# Patient Record
Sex: FEMALE | Race: WHITE | Employment: OTHER | URBAN - METROPOLITAN AREA
[De-identification: names, ages, dates, MRNs, and addresses within clinical notes are randomized per-mention and may not be internally consistent; named-entity substitution may affect disease eponyms.]

---

## 2017-05-16 ENCOUNTER — HOSPITAL ENCOUNTER (OUTPATIENT)
Dept: PULMONOLOGY | Age: 72
Discharge: HOME | End: 2017-05-16

## 2017-05-16 VITALS — RESPIRATION RATE: 14 BRPM | OXYGEN SATURATION: 98 %

## 2017-05-16 DIAGNOSIS — R91.1 SOLITARY PULMONARY NODULE: ICD-10-CM

## 2017-05-16 DIAGNOSIS — R91.1 PULMONARY NODULE: ICD-10-CM

## 2017-05-16 DIAGNOSIS — Z85.118 HISTORY OF LUNG CANCER: ICD-10-CM

## 2017-05-16 RX ORDER — ALBUTEROL SULFATE 2.5 MG/3ML
2.5 SOLUTION RESPIRATORY (INHALATION) ONCE
Status: COMPLETED | OUTPATIENT
Start: 2017-05-16 | End: 2017-05-16

## 2017-05-16 RX ADMIN — ALBUTEROL SULFATE 2.5 MG: 2.5 SOLUTION RESPIRATORY (INHALATION) at 12:03

## 2017-05-17 ENCOUNTER — OFFICE VISIT (OUTPATIENT)
Dept: PULMONOLOGY | Age: 72
End: 2017-05-17

## 2017-05-17 VITALS
SYSTOLIC BLOOD PRESSURE: 124 MMHG | HEART RATE: 81 BPM | BODY MASS INDEX: 21.86 KG/M2 | HEIGHT: 66 IN | OXYGEN SATURATION: 98 % | TEMPERATURE: 97.2 F | DIASTOLIC BLOOD PRESSURE: 54 MMHG | WEIGHT: 136 LBS

## 2017-05-17 DIAGNOSIS — R91.1 PULMONARY NODULE: Primary | ICD-10-CM

## 2017-05-17 DIAGNOSIS — R59.0 MEDIASTINAL LYMPHADENOPATHY: ICD-10-CM

## 2017-05-17 DIAGNOSIS — J43.9 PULMONARY EMPHYSEMA, UNSPECIFIED EMPHYSEMA TYPE (HCC): ICD-10-CM

## 2017-05-17 PROCEDURE — 99214 OFFICE O/P EST MOD 30 MIN: CPT | Performed by: INTERNAL MEDICINE

## 2017-05-17 RX ORDER — ALBUTEROL SULFATE 90 UG/1
2 AEROSOL, METERED RESPIRATORY (INHALATION) EVERY 6 HOURS PRN
Qty: 1 INHALER | Refills: 6 | Status: SHIPPED | OUTPATIENT
Start: 2017-05-17 | End: 2017-11-20

## 2017-11-20 ENCOUNTER — OFFICE VISIT (OUTPATIENT)
Dept: PULMONOLOGY | Age: 72
End: 2017-11-20

## 2017-11-20 ENCOUNTER — TELEPHONE (OUTPATIENT)
Dept: PULMONOLOGY | Age: 72
End: 2017-11-20

## 2017-11-20 VITALS
HEART RATE: 79 BPM | HEIGHT: 66 IN | WEIGHT: 140.6 LBS | SYSTOLIC BLOOD PRESSURE: 114 MMHG | RESPIRATION RATE: 20 BRPM | OXYGEN SATURATION: 97 % | TEMPERATURE: 97.5 F | BODY MASS INDEX: 22.6 KG/M2 | DIASTOLIC BLOOD PRESSURE: 58 MMHG

## 2017-11-20 DIAGNOSIS — J84.9 ILD (INTERSTITIAL LUNG DISEASE) (HCC): ICD-10-CM

## 2017-11-20 DIAGNOSIS — J43.9 PULMONARY EMPHYSEMA, UNSPECIFIED EMPHYSEMA TYPE (HCC): Primary | ICD-10-CM

## 2017-11-20 DIAGNOSIS — R09.81 NASAL CONGESTION: ICD-10-CM

## 2017-11-20 PROCEDURE — 99214 OFFICE O/P EST MOD 30 MIN: CPT | Performed by: INTERNAL MEDICINE

## 2017-11-20 RX ORDER — ALBUTEROL SULFATE 2.5 MG/3ML
2.5 SOLUTION RESPIRATORY (INHALATION) EVERY 6 HOURS PRN
Qty: 120 EACH | Refills: 5 | Status: SHIPPED | OUTPATIENT
Start: 2017-11-20 | End: 2018-10-02

## 2017-11-20 RX ORDER — ALBUTEROL SULFATE 90 UG/1
2 AEROSOL, METERED RESPIRATORY (INHALATION) EVERY 6 HOURS PRN
COMMUNITY
End: 2018-10-02

## 2017-11-20 RX ORDER — FLUTICASONE PROPIONATE 50 MCG
2 SPRAY, SUSPENSION (ML) NASAL DAILY
Qty: 1 BOTTLE | Refills: 5 | Status: SHIPPED | OUTPATIENT
Start: 2017-11-20 | End: 2020-01-01

## 2017-11-20 NOTE — PROGRESS NOTES
12/14/2011 FEV1 2.18L(99%)   TLC 4.77L(93%)   DLCO 16.8   (91%)  PFTs 03/24/2011 FEV1 2.30L             TLC 5.14L             DLCO 18.32      CT chest 5/16/2017  images reviewed by me and showed:   Mediastinum: No significant change size and appearance of pre-vascular lymph nodes. No new enlarged mediastinal nodes. Thoracic aorta normal in caliber. No significant pericardial effusion   Lungs/pleura: Status post partial pneumonectomy on the left. There is emphysema. There are stable ground-glass and coarse reticular opacities in both lungs. There are calcified granulomas. There are no new suspicious pulmonary nodules. The pleural spaces are clear. Upper Abdomen: Adrenal glands unremarkable. Calcified gallstone noted   Soft Tissues/Bones: No suspicious bone lesions       Assessment:      · Emphysema  · Nasal congestion - Likely allergic rhinitis  · Basilar mild ILD. IPF vs NSIP. Negative CTD workup. Not very symptomatic. Will continue to follow up with PFTs. · RML pulmonary nodule, more prominent on CT chest 11/17/2016. Improved on repeat CT chest 5/16/2017   · Anterior mediastinal enlarged LN 11/2/2015 with SUV of 3.3. Bx  11/23/2015 showed small amount of fibroadipose tissue with a small lymphoid aggregate. Negative flow cytometry. Favor reactive LN. Stable on repeat CT  · Post thoracotomy pain syndrome - improved and now off gabapentin    · LLL 1.7 cm nodule 10/2/2014 (SUV 4.9). 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 Declined adjuvant chemotherapy   · Lingula wedge resection showed calcified granulomas with fungal organism.    · > 50 pack year smoking        Plan:      · PFTs and CT chest 5/2018   · Trial of Albuterol Neb Q4-6 hrs PRN   · Albuterol 2 puffs Q4-6 hrs PRN  · Trial of Flonase 2 sprays/nostril daily  · Patient is up to date with Pneumococcal vaccine and influenza vaccine   · Smoking cessation counseling

## 2018-05-23 ENCOUNTER — HOSPITAL ENCOUNTER (OUTPATIENT)
Dept: PULMONOLOGY | Age: 73
Discharge: HOME | End: 2018-05-23
Attending: INTERNAL MEDICINE | Admitting: INTERNAL MEDICINE

## 2018-05-23 ENCOUNTER — OFFICE VISIT (OUTPATIENT)
Dept: PULMONOLOGY | Age: 73
End: 2018-05-23

## 2018-05-23 VITALS
HEIGHT: 66 IN | WEIGHT: 140 LBS | HEART RATE: 73 BPM | OXYGEN SATURATION: 97 % | DIASTOLIC BLOOD PRESSURE: 52 MMHG | SYSTOLIC BLOOD PRESSURE: 125 MMHG | TEMPERATURE: 97.5 F | RESPIRATION RATE: 16 BRPM | BODY MASS INDEX: 22.5 KG/M2

## 2018-05-23 VITALS — OXYGEN SATURATION: 98 %

## 2018-05-23 DIAGNOSIS — R59.1 LYMPHADENOPATHY: ICD-10-CM

## 2018-05-23 DIAGNOSIS — F17.200 CURRENT SMOKER: ICD-10-CM

## 2018-05-23 DIAGNOSIS — R09.81 NASAL CONGESTION: ICD-10-CM

## 2018-05-23 DIAGNOSIS — R91.1 PULMONARY NODULE: ICD-10-CM

## 2018-05-23 DIAGNOSIS — R91.1 PULMONARY NODULE: Primary | ICD-10-CM

## 2018-05-23 DIAGNOSIS — J43.9 PULMONARY EMPHYSEMA, UNSPECIFIED EMPHYSEMA TYPE (HCC): ICD-10-CM

## 2018-05-23 DIAGNOSIS — R59.0 MEDIASTINAL LYMPHADENOPATHY: ICD-10-CM

## 2018-05-23 DIAGNOSIS — J84.9 ILD (INTERSTITIAL LUNG DISEASE) (HCC): ICD-10-CM

## 2018-05-23 PROCEDURE — 99214 OFFICE O/P EST MOD 30 MIN: CPT | Performed by: INTERNAL MEDICINE

## 2018-05-23 RX ORDER — ALBUTEROL SULFATE 2.5 MG/3ML
2.5 SOLUTION RESPIRATORY (INHALATION) ONCE
Status: COMPLETED | OUTPATIENT
Start: 2018-05-23 | End: 2018-05-23

## 2018-05-23 RX ORDER — VARENICLINE TARTRATE 0.5 MG/1
TABLET, FILM COATED ORAL
Qty: 319 TABLET | Refills: 0 | Status: SHIPPED | OUTPATIENT
Start: 2018-05-23 | End: 2018-10-02 | Stop reason: SINTOL

## 2018-05-23 RX ADMIN — ALBUTEROL SULFATE 2.5 MG: 2.5 SOLUTION RESPIRATORY (INHALATION) at 10:48

## 2018-05-23 ASSESSMENT — ENCOUNTER SYMPTOMS: SHORTNESS OF BREATH: 1

## 2018-05-29 ENCOUNTER — TELEPHONE (OUTPATIENT)
Dept: PULMONOLOGY | Age: 73
End: 2018-05-29

## 2018-09-19 ENCOUNTER — TELEPHONE (OUTPATIENT)
Dept: PULMONOLOGY | Age: 73
End: 2018-09-19

## 2018-09-19 ENCOUNTER — HOSPITAL ENCOUNTER (OUTPATIENT)
Dept: CT IMAGING | Age: 73
Discharge: HOME | End: 2018-09-19
Payer: COMMERCIAL

## 2018-09-19 ENCOUNTER — HOSPITAL ENCOUNTER (OUTPATIENT)
Dept: PULMONOLOGY | Age: 73
Discharge: HOME | End: 2018-09-19
Payer: COMMERCIAL

## 2018-09-19 ENCOUNTER — OFFICE VISIT (OUTPATIENT)
Dept: PULMONOLOGY | Age: 73
End: 2018-09-19

## 2018-09-19 VITALS
RESPIRATION RATE: 18 BRPM | OXYGEN SATURATION: 95 % | WEIGHT: 141.6 LBS | SYSTOLIC BLOOD PRESSURE: 132 MMHG | HEART RATE: 76 BPM | TEMPERATURE: 97.9 F | HEIGHT: 66 IN | DIASTOLIC BLOOD PRESSURE: 56 MMHG | BODY MASS INDEX: 22.76 KG/M2

## 2018-09-19 DIAGNOSIS — C79.9 METASTATIC DISEASE (HCC): ICD-10-CM

## 2018-09-19 DIAGNOSIS — R91.1 SOLITARY PULMONARY NODULE: ICD-10-CM

## 2018-09-19 DIAGNOSIS — Z85.118 HISTORY OF LUNG CANCER: ICD-10-CM

## 2018-09-19 DIAGNOSIS — R91.1 PULMONARY NODULE: Primary | ICD-10-CM

## 2018-09-19 DIAGNOSIS — J84.9 ILD (INTERSTITIAL LUNG DISEASE) (HCC): ICD-10-CM

## 2018-09-19 DIAGNOSIS — R59.1 LYMPHADENOPATHY: ICD-10-CM

## 2018-09-19 DIAGNOSIS — Z87.891 PERSONAL HISTORY OF SMOKING: ICD-10-CM

## 2018-09-19 DIAGNOSIS — R91.1 PULMONARY NODULE: ICD-10-CM

## 2018-09-19 DIAGNOSIS — J43.9 PULMONARY EMPHYSEMA, UNSPECIFIED EMPHYSEMA TYPE (HCC): ICD-10-CM

## 2018-09-19 DIAGNOSIS — R91.1 LUNG NODULE: Primary | ICD-10-CM

## 2018-09-19 PROCEDURE — 71250 CT THORAX DX C-: CPT

## 2018-09-19 PROCEDURE — 99214 OFFICE O/P EST MOD 30 MIN: CPT | Performed by: INTERNAL MEDICINE

## 2018-09-19 PROCEDURE — 94618 PULMONARY STRESS TESTING: CPT

## 2018-09-19 PROCEDURE — 94060 EVALUATION OF WHEEZING: CPT

## 2018-09-19 PROCEDURE — 94664 DEMO&/EVAL PT USE INHALER: CPT

## 2018-09-19 PROCEDURE — 94640 AIRWAY INHALATION TREATMENT: CPT

## 2018-09-19 PROCEDURE — 6360000002 HC RX W HCPCS: Performed by: INTERNAL MEDICINE

## 2018-09-19 PROCEDURE — 94729 DIFFUSING CAPACITY: CPT

## 2018-09-19 PROCEDURE — 94726 PLETHYSMOGRAPHY LUNG VOLUMES: CPT

## 2018-09-19 RX ORDER — ALBUTEROL SULFATE 2.5 MG/3ML
2.5 SOLUTION RESPIRATORY (INHALATION) ONCE
Status: COMPLETED | OUTPATIENT
Start: 2018-09-19 | End: 2018-09-19

## 2018-09-19 RX ADMIN — ALBUTEROL SULFATE 2.5 MG: 2.5 SOLUTION RESPIRATORY (INHALATION) at 09:55

## 2018-09-19 NOTE — PATIENT INSTRUCTIONS
Tips to Help You Stop Smoking (taken from Up-To-Date)      Cigarette smoking is a preventable cause of death in the [place]. Quitting smoking now can decrease your risk of getting smoking-related illnesses like heart disease, stroke, cancer & COPD. S = Set a quit date. T = Tell family, friends, and the people around you that you plan to quit. A = Anticipate or plan ahead for the tough times you'll face while quitting. R = Remove cigarettes and other tobacco products from your home, car, and work. T = Talk to your doctor about getting help to quit. Your doctor may give you medicines to reduce your craving for cigarettes & the unpleasant symptoms that happen when you stop smoking (called withdrawal symptoms). What are the symptoms of withdrawal?  The symptoms include:   ?Trouble sleeping   ? Being irritable, anxious or restless   ? Getting frustrated or angry   ? Having trouble thinking clearly  Nicotine replacement therapy eases withdrawal and reduces your bodys craving for nicotine, the main drug found in cigarettes. Non-prescription forms of nicotine replacement include skin patches, lozenges, and gum. Two prescription medications are available that have been proven to help people stop smoking:  ? Bupropion is a prescription medicine that reduces your desire to smoke. This medicine is sold under the brand names Zyban and Wellbutrin & as a generic formulation. ? Varenicline (brand name: Chantix) is a prescription medicine that reduces withdrawal symptoms and cigarette cravings. If you take bupropion or varenicline and you have any new or worsening depressed mood or thoughts of harming yourself or someone else, stop taking the medicine and call your doctor. Buproprion should not be taken by anyone with a history of seizure or epilepsy. Will I gain weight if I quit?  Yes, you might gain a few pounds.  But quitting smoking will have a much more positive effect on your health than weighing a few pounds more. Plus, you can help prevent some weight gain by being more active and eating less. Taking the medicine bupropion might help control weight gain. What else can I do to improve my chances of quitting?  You can:  ?Start exercising. ?Stay away from smokers and places that you associate with smoking. If people close to you smoke, ask them to quit with you. ? Keep gum, hard candy, or something to put in your mouth handy. If you get a craving for a cigarette, try one of these instead. ?Dont give up, even if you start smoking again. It takes most people a few tries before they succeed. You can also get help from a free phone line (2-501-QUIT-NOW) or go online to www.smokefree.gov. Your pulmonary team is here to help you quit.   Call for assistance [phone]

## 2018-09-19 NOTE — PROGRESS NOTES
[facility]                                                                    CHIEF COMPLAINT: Follow-up CT chest and PFTs        HPI:   PFTs and CT chest were reviewed by me and noted. Results were dicussed with patient and multiple good questions were answered. Breathing is good. Occasional cough and wheezes. Clear sputum. No hemoptysis. Tries to be active. Smoking still 1/2 ppd     Past Medical History:   Diagnosis Date    Acid reflux     Arthritis     back and Rt hip pain    Emphysema of lung (HCC)     Hyperlipidemia     Hypertension     ILD (interstitial lung disease) (HCC)     Lung nodule left    Pneumonia     Seasonal allergies     Thyroid disease        Past Surgical History:        Procedure Laterality Date    BONE BIOPSY  2/10/2011    left hip and pelvis    COLONOSCOPY  12/2/10    EYE SURGERY      LUNG BIOPSY Left 10/14/2014    OTHER SURGICAL HISTORY Left 12/29/2014    left thoracotomy,lowerlobectomy,upper wedge resection    TONSILLECTOMY      TUBAL LIGATION         Allergies:  is allergic to lipitor [atorvastatin]. Social History:    TOBACCO:   reports that she has been smoking Cigarettes. She has a 40.00 pack-year smoking history. She has never used smokeless tobacco.  ETOH:   reports that she drinks about 0.6 oz of alcohol per week .       Family History:   Family History   Problem Relation Age of Onset    Stroke Mother    [facility] Cancer Sister         one sis colon   one pancreatic    Hypertension Sister     Stroke Sister    [facility] Cancer Sister     Hypertension Sister     Heart Failure Brother     Hypertension Brother     Asthma Neg Hx     Emphysema Neg Hx     Diabetes Neg Hx        Current Medications:    Current Outpatient Prescriptions:     Cetirizine HCl (ZYRTEC ALLERGY) 10 MG CAPS, Take 10 mg by mouth, Disp: , Rfl:     albuterol sulfate  (90 Base) MCG/ACT inhaler, Inhale 2 puffs into the lungs every 6 hours as needed for Wheezing, Disp: , Rfl:     albuterol (PROVENTIL) (2.5 MG/3ML) 0.083% nebulizer solution, Take 3 mLs by nebulization every 6 hours as needed for Wheezing J43.9, Disp: 120 each, Rfl: 5    fluticasone (FLONASE) 50 MCG/ACT nasal spray, 2 sprays by Nasal route daily, Disp: 1 Bottle, Rfl: 5    pravastatin (PRAVACHOL) 40 MG tablet, Take 40 mg by mouth daily, Disp: , Rfl:     gabapentin (NEURONTIN) 300 MG capsule, Take 300 mg by mouth every other day , Disp: , Rfl:     esomeprazole (NEXIUM) 20 MG capsule, Take 20 mg by mouth every morning (before breakfast). , Disp: , Rfl:     Losartan Potassium (COZAAR PO), Take 50 mg by mouth daily. , Disp: , Rfl:     levothyroxine (SYNTHROID) 100 MCG tablet, Take 75 mcg by mouth daily. , Disp: , Rfl:     varenicline (CHANTIX) 0.5 MG tablet, 0.5 mg daily for 3 days 0.5 mg BID for 4 days 1 mg po BID for 11 weeks, Disp: 319 tablet, Rfl: 0    Blood pressure (!) 132/56, pulse 76, temperature 97.9 °F (36.6 °C), resp. rate 18, height 5' 5.5\" (1.664 m), weight 141 lb 9.6 oz (64.2 kg), SpO2 95 %.' on RA  Gen: No distress. Eyes: PERRL. No sclera icterus. No conjunctival injection. ENT: No discharge. Pharynx clear. Neck: Trachea midline. No obvious mass. Resp: No accessory muscle use. R crackles. No wheezes. No rhonchi. No dullness on percussion. Good air entry. CV: Regular rate. Regular rhythm. No murmur or rub. No edema. GI: Non-tender. Non-distended. No hernia. Skin: Warm and dry. No nodule on exposed extremities. Lymph: No cervical LAD. No supraclavicular LAD. M/S: No cyanosis. No joint deformity. No clubbing. Neuro: Awake. Alert. Moves all four extremities. Psych: Oriented x 3. No anxiety.      DATA reviewed by me:   PFTs 09/19/2018 FEV1 2.01L(87%)   TLC 4.21(80%)     DLCO 11.92 (42%) 6MW 780 LO2 94%   PFTs 05/23/2018 FEV1 1.87L(81%)   TLC 4.24(81%)     DLCO 10.51 (46%)   PFTs 05/16/2017 FEV1 1.88L(80%)   TLC 4.83(91%)     DLCO 13.03 (57%)   PFTs 11/12/2015 FEV1 2.00L(84%)   TLC 4.08(77%)     DLCO 12.43 (54%) 6MW 1120F L O2 97% RA  PFTs 06/16/2015 FEV1 1.77L(73%)   TLC 4.04L(76%)   DLCO 15.59 (67%) 6MW 1120F L O2 93% RA  PFTs 05/13/2014 FEV1 2.12L(87%)   TLC 5.24L(98%)   DLCO 16.78 (72%) 6MW 1120F L O2 93% RA  PFTs 03/21/2013 FEV1 2.15L(87%)   TLC 5.43L(101%) DLCO 16.96 (72%) 6MW 1120F L O2 95% RA  PFTs 09/27/2012 FEV1 2.12L(85%)   TLC 5.31L(99%)   DLCO 16.74 (71%) 6MW 1260F L O2 95% RA  PFTs 03/21/2012 FEV1 2.27L(102%) TLC 5.14L(101%) DLCO 18.48 (91%) 6MW 1350F L O2 94% RA  PFTs 12/14/2011 FEV1 2.18L(99%)   TLC 4.77L(93%)   DLCO 16.8   (91%)  PFTs 03/24/2011 FEV1 2.30L             TLC 5.14L             DLCO 18.32      CT chest 5/23/2018    Increase groundglass attenuation left lung  New left 8mm nodule  Emphysema with chronic interstitial 1 disease  Stable mediastinal lymph nodes    CT chest 9/19/18 imaging reviewed by me and showed  Growing left upper lobe nodule on my read    Assessment:   · New 8 mm TOSHIA nodule. Growing on repeat CT chest 9/19/2018. Concerning for bronchogenic carcinoma   · Pulmonary Emphysema  · Basilar mild ILD. IPF vs NSIP vs smoking related ILD. Negative CTD workup. · RML pulmonary nodule, more prominent on CT chest 11/17/2016. Improved on repeat CT chest 5/16/2017   · Anterior mediastinal enlarged LN 11/2/2015 with SUV of 3.3. Bx  11/23/2015 showed small amount of fibroadipose tissue with a small lymphoid aggregate. Negative flow cytometry. Favor reactive LN. Stable on repeat CT  · Post thoracotomy pain syndrome - improved and now off gabapentin    · LLL 1.7 cm nodule 10/2/2014 (SUV 4.9). 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 Declined adjuvant chemotherapy   · Lingula wedge resection showed calcified granulomas with fungal organism.    · > 50 pack year smoking        Plan:   PET scan   Bronch with EBUS TBNA if hypermetabolic adenopathy   CT surgery resection evaluation if negative PET for metastatic disease.     PFTs/6MW in 6 months  Albuterol Neb/INH Q4-6 hrs PRN   Patient is up to date with Pneumococcal vaccine   Advised to get influenza vaccine this year   Smoking cessation counseling

## 2018-09-25 ENCOUNTER — HOSPITAL ENCOUNTER (OUTPATIENT)
Dept: PET IMAGING | Age: 73
Discharge: HOME | End: 2018-09-25
Payer: COMMERCIAL

## 2018-09-25 ENCOUNTER — HOSPITAL ENCOUNTER (OUTPATIENT)
Age: 73
Discharge: HOME | End: 2018-09-25
Payer: COMMERCIAL

## 2018-09-25 DIAGNOSIS — R91.1 LUNG NODULE: ICD-10-CM

## 2018-09-25 PROCEDURE — A9552 F18 FDG: HCPCS | Performed by: INTERNAL MEDICINE

## 2018-09-25 PROCEDURE — 3430000000 HC RX DIAGNOSTIC RADIOPHARMACEUTICAL: Performed by: INTERNAL MEDICINE

## 2018-09-25 PROCEDURE — 78815 PET IMAGE W/CT SKULL-THIGH: CPT

## 2018-09-25 RX ORDER — FLUDEOXYGLUCOSE F 18 200 MCI/ML
13.7 INJECTION, SOLUTION INTRAVENOUS
Status: COMPLETED | OUTPATIENT
Start: 2018-09-25 | End: 2018-09-25

## 2018-09-25 RX ADMIN — FLUDEOXYGLUCOSE F 18 13.7 MILLICURIE: 200 INJECTION, SOLUTION INTRAVENOUS at 14:03

## 2018-09-26 NOTE — TELEPHONE ENCOUNTER
Order pended. Patient scheduled with Dr Callie Barros 10/2/18 @ 12:30, patient also given PFT and f/u information with verbal understanding.      PET Scan  Notes recorded by Kenna Mcrae MD on 9/26/2018 at 8:04 AM EDT  I called patient and left a message to call back   Needs referral to Dr. Callie Barros for resection evaluation  SBRT if she is deemed not surgical candidate

## 2018-09-27 NOTE — TELEPHONE ENCOUNTER
Dr. Yanira Gandara returned call will be out of office Friday 9/28/18. Can be reached on Monday in office.

## 2018-10-02 ENCOUNTER — OFFICE VISIT (OUTPATIENT)
Dept: CARDIOTHORACIC SURGERY | Age: 73
End: 2018-10-02
Payer: COMMERCIAL

## 2018-10-02 VITALS
WEIGHT: 142 LBS | DIASTOLIC BLOOD PRESSURE: 64 MMHG | TEMPERATURE: 97.5 F | HEART RATE: 74 BPM | OXYGEN SATURATION: 97 % | HEIGHT: 66 IN | SYSTOLIC BLOOD PRESSURE: 138 MMHG | BODY MASS INDEX: 22.82 KG/M2

## 2018-10-02 DIAGNOSIS — Z85.118 HISTORY OF LUNG CANCER: ICD-10-CM

## 2018-10-02 DIAGNOSIS — F17.200 TOBACCO USE DISORDER: ICD-10-CM

## 2018-10-02 DIAGNOSIS — R91.1 PULMONARY NODULE, LEFT: Primary | ICD-10-CM

## 2018-10-02 PROCEDURE — 99215 OFFICE O/P EST HI 40 MIN: CPT | Performed by: THORACIC SURGERY (CARDIOTHORACIC VASCULAR SURGERY)

## 2018-10-02 RX ORDER — LEVOTHYROXINE SODIUM 0.07 MG/1
75 TABLET ORAL DAILY
COMMUNITY

## 2018-10-02 NOTE — PROGRESS NOTES
Review of Systems:  Constitutional:  No night sweats, weight loss. + headaches  Eyes:  No glaucoma, cataracts (removed). Wears glasses. ENMT:  No nosebleeds, deviated septum. Nasal stuffiness. Cardiac:  No arrhythmias, palpitations. Vascular:  No claudication, varicosities. GI:  No PUD.  + GERD, heartburn. :  No kidney stones, frequent UTIs  Musculoskeletal:  + arthritis. No gout. Respiratory:  Occasional SOB. + emphysema. Integumentary:  No dermatitis, itching, rash. + moles. Neurological:  No stroke, TIAs, seizures. Psychiatric:  Situational depression, anxiety. Endocrine: No diabetes. + thyroid issues. Hematologic:  No bleeding, easy bruising. Immunologic:  + known cancer. No steroid therapies.

## 2018-10-02 NOTE — PROGRESS NOTES
Department of Cardiovascular & Thoracic Surgery  History and Physical          DIAGNOSIS:  Pulmonary nodule. S/P left lower lobe lung cancer resection. Tobacco abuse disorder. CHIEF COMPLAINT:    Chief Complaint   Patient presents with   Chaka Belle Patient     Mrs. Rusty Jeffery is being seen, at the request of Dr. Marissa Thorne, for surgical consideration of a new Left Lung Nodule found during surveillance. Denies shortness of breath, hemoptysis but does have a cough. History Obtained From:  patient, spouse, electronic medical record    HISTORY OF PRESENT ILLNESS:      The patient is a 67 y.o. female with significant past medical history of left lower lobe lung cancer for which she underwent a left lower lobectomy 12/29/14, COPD and hypertension who presents with an enlarging left upper lobe pulmonary nodule that is PET avid. She has had no cough. No hemoptysis. No fevers or chills. She is followed by Dr. Marissa Thorne and on her most recent CT scan of the chest was found to have enlargement of this left lung nodule that is spiculated in nature. She also has an enlarged intermedius a lymph node that is also PET avid. She has no chest pain.   I am asked to see her regarding surgical resection of her pulmonary nodule    Past Medical History:    Past Medical History:   Diagnosis Date    Acid reflux     Arthritis     back and Rt hip pain    Emphysema of lung ([address]     Hyperlipidemia     Hypertension     ILD (interstitial lung disease) ([address]     Lung nodule left    Pneumonia     Seasonal allergies     Thyroid disease        Past Surgical History:    Past Surgical History:   Procedure Laterality Date    BONE BIOPSY  2/10/2011    left hip and pelvis    COLONOSCOPY  12/2/10    EYE SURGERY      LUNG BIOPSY Left 10/14/2014    OTHER SURGICAL HISTORY Left 12/29/2014    left thoracotomy,lowerlobectomy,upper wedge resection    TONSILLECTOMY      TUBAL LIGATION         Medications:   Current Outpatient Prescriptions MCHC 33.9 11/23/2015    RDW 13.2 11/23/2015     11/23/2015    MPV 7.3 11/23/2015     CMP:    Lab Results   Component Value Date     01/01/2015    K 3.6 01/01/2015    CL 93 01/01/2015    CO2 23 01/01/2015    BUN 11 01/01/2015    CREATININE 0.8 05/17/2016    GFRAA >60 05/17/2016    LABGLOM >60 05/17/2016    GLUCOSE 131 01/01/2015    LABALBU 3.6 12/31/2014    CALCIUM 9.1 01/01/2015     Hepatic Function Panel:    Lab Results   Component Value Date    LABALBU 3.6 12/31/2014 9/19/18 CT Chest scan:  I have independently reviewed and interpreted this study and based my surgical recommendations to the patient on those findings. I agree with the following:   Spiculated lesion left lower lobe that has almost doubled in size since the last CT scan of the chest in May 2018. This coincides with a hypermetabolic activity seen on PET scan. There is a anterior mediastinal lymph node that has enlarged in size and is also patent avid. There are no pleural effusions. 9/25/18 PET/CT:I have independently reviewed and interpreted this study and based my surgical recommendations to the patient on those findings. I agree with the following:   HEAD/NECK: No metabolically active cervical lymphadenopathy.       CHEST: Focal FDG activity localizes to the anterior mediastinal nodule   measuring 1.9 x 1.5 cm, SUV max of 4.8, not significantly changed since the   recent CT scan when accounting for differences in slice selection.  A similar   nodule was present on the PET-CT scan 11/05/2015 measuring 1.6 x 0.8 cm at   that time.       Focal FDG activity localizes to a 1.5 x 1.4 cm left lower lobe nodule, not   significantly changed from the recent exam, SUV max of 17.6.       No other metabolically active pulmonary nodule. ASSESSMENT AND PLAN:    69-year-old female with a history of lung cancer. She continues to smoke cigarettes. She presents now in follow-up with an enlarging left lung pulmonary nodule.   It is

## 2018-10-09 ENCOUNTER — TELEPHONE (OUTPATIENT)
Dept: CARDIOTHORACIC SURGERY | Age: 73
End: 2018-10-09

## 2018-11-05 ENCOUNTER — HOSPITAL ENCOUNTER (OUTPATIENT)
Dept: CT IMAGING | Age: 73
Discharge: HOME | End: 2018-11-05
Payer: MEDICARE

## 2018-11-05 ENCOUNTER — HOSPITAL ENCOUNTER (OUTPATIENT)
Dept: GENERAL RADIOLOGY | Age: 73
Discharge: HOME | End: 2018-11-05
Payer: MEDICARE

## 2018-11-05 ENCOUNTER — HOSPITAL ENCOUNTER (OUTPATIENT)
Age: 73
Discharge: HOME | End: 2018-11-05
Payer: MEDICARE

## 2018-11-05 VITALS
HEART RATE: 74 BPM | SYSTOLIC BLOOD PRESSURE: 147 MMHG | DIASTOLIC BLOOD PRESSURE: 68 MMHG | RESPIRATION RATE: 18 BRPM | OXYGEN SATURATION: 97 %

## 2018-11-05 DIAGNOSIS — Z98.890 STATUS POST BIOPSY: ICD-10-CM

## 2018-11-05 DIAGNOSIS — R59.0 MEDIASTINAL LYMPHADENOPATHY: ICD-10-CM

## 2018-11-05 DIAGNOSIS — R91.1 PULMONARY NODULE: ICD-10-CM

## 2018-11-05 LAB
APTT: 31 SEC (ref 26–36)
HCT VFR BLD CALC: 37.5 % (ref 36–48)
HEMOGLOBIN: 12.9 G/DL (ref 12–16)
INR BLD: 1.05 (ref 0.86–1.14)
MCH RBC QN AUTO: 33.9 PG (ref 26–34)
MCHC RBC AUTO-ENTMCNC: 34.4 G/DL (ref 31–36)
MCV RBC AUTO: 98.5 FL (ref 80–100)
PDW BLD-RTO: 12.6 % (ref 12.4–15.4)
PLATELET # BLD: 236 K/UL (ref 135–450)
PMV BLD AUTO: 7.5 FL (ref 5–10.5)
PROTHROMBIN TIME: 12 SEC (ref 9.8–13)
RBC # BLD: 3.8 M/UL (ref 4–5.2)
WBC # BLD: 8.6 K/UL (ref 4–11)

## 2018-11-05 PROCEDURE — 85610 PROTHROMBIN TIME: CPT

## 2018-11-05 PROCEDURE — 77012 CT SCAN FOR NEEDLE BIOPSY: CPT

## 2018-11-05 PROCEDURE — 88305 TISSUE EXAM BY PATHOLOGIST: CPT

## 2018-11-05 PROCEDURE — 36415 COLL VENOUS BLD VENIPUNCTURE: CPT

## 2018-11-05 PROCEDURE — 85027 COMPLETE CBC AUTOMATED: CPT

## 2018-11-05 PROCEDURE — 71045 X-RAY EXAM CHEST 1 VIEW: CPT

## 2018-11-05 PROCEDURE — 2709999900 CT NEEDLE BIOPSY LUNG PERCUTANEOUS

## 2018-11-05 PROCEDURE — 85730 THROMBOPLASTIN TIME PARTIAL: CPT

## 2018-11-05 PROCEDURE — 6360000002 HC RX W HCPCS: Performed by: RADIOLOGY

## 2018-11-05 RX ORDER — MIDAZOLAM HYDROCHLORIDE 5 MG/ML
INJECTION INTRAMUSCULAR; INTRAVENOUS
Status: COMPLETED | OUTPATIENT
Start: 2018-11-05 | End: 2018-11-05

## 2018-11-05 RX ORDER — FENTANYL CITRATE 50 UG/ML
INJECTION, SOLUTION INTRAMUSCULAR; INTRAVENOUS
Status: COMPLETED | OUTPATIENT
Start: 2018-11-05 | End: 2018-11-05

## 2018-11-05 RX ADMIN — FENTANYL CITRATE 50 MCG: 50 INJECTION INTRAMUSCULAR; INTRAVENOUS at 09:52

## 2018-11-05 RX ADMIN — MIDAZOLAM HYDROCHLORIDE 1 MG: 5 INJECTION, SOLUTION INTRAMUSCULAR; INTRAVENOUS at 09:53

## 2018-11-13 ENCOUNTER — TELEPHONE (OUTPATIENT)
Dept: CARDIOTHORACIC SURGERY | Age: 73
End: 2018-11-13

## 2018-11-26 ENCOUNTER — TELEPHONE (OUTPATIENT)
Dept: CARDIOTHORACIC SURGERY | Age: 73
End: 2018-11-26

## 2018-11-26 DIAGNOSIS — R91.8 LUNG MASS: Primary | ICD-10-CM

## 2018-11-28 ENCOUNTER — HOSPITAL ENCOUNTER (OUTPATIENT)
Dept: INTERVENTIONAL RADIOLOGY/VASCULAR | Age: 73
Discharge: HOME | End: 2018-11-28
Payer: MEDICARE

## 2018-11-28 ENCOUNTER — HOSPITAL ENCOUNTER (OUTPATIENT)
Dept: GENERAL RADIOLOGY | Age: 73
Discharge: HOME | End: 2018-11-28
Payer: MEDICARE

## 2018-11-28 VITALS
HEIGHT: 65 IN | WEIGHT: 141 LBS | BODY MASS INDEX: 23.49 KG/M2 | HEART RATE: 77 BPM | SYSTOLIC BLOOD PRESSURE: 132 MMHG | TEMPERATURE: 96.9 F | OXYGEN SATURATION: 96 % | DIASTOLIC BLOOD PRESSURE: 56 MMHG | RESPIRATION RATE: 16 BRPM

## 2018-11-28 DIAGNOSIS — R91.8 LUNG MASS: ICD-10-CM

## 2018-11-28 LAB
INR BLD: 1.03 (ref 0.86–1.14)
PLATELET # BLD: 292 K/UL (ref 135–450)
PROTHROMBIN TIME: 11.7 SEC (ref 9.8–13)

## 2018-11-28 PROCEDURE — 71045 X-RAY EXAM CHEST 1 VIEW: CPT

## 2018-11-28 PROCEDURE — 88333 PATH CONSLTJ SURG CYTO XM 1: CPT

## 2018-11-28 PROCEDURE — 77012 CT SCAN FOR NEEDLE BIOPSY: CPT

## 2018-11-28 PROCEDURE — 88184 FLOWCYTOMETRY/ TC 1 MARKER: CPT

## 2018-11-28 PROCEDURE — 88305 TISSUE EXAM BY PATHOLOGIST: CPT

## 2018-11-28 PROCEDURE — 85610 PROTHROMBIN TIME: CPT

## 2018-11-28 PROCEDURE — 7100000011 HC PHASE II RECOVERY - ADDTL 15 MIN

## 2018-11-28 PROCEDURE — 6360000002 HC RX W HCPCS: Performed by: RADIOLOGY

## 2018-11-28 PROCEDURE — 85049 AUTOMATED PLATELET COUNT: CPT

## 2018-11-28 PROCEDURE — 7100000010 HC PHASE II RECOVERY - FIRST 15 MIN

## 2018-11-28 PROCEDURE — 36415 COLL VENOUS BLD VENIPUNCTURE: CPT

## 2018-11-28 PROCEDURE — 88185 FLOWCYTOMETRY/TC ADD-ON: CPT

## 2018-11-28 PROCEDURE — 2709999900 CT NEEDLE BIOPSY LUNG PERCUTANEOUS

## 2018-11-28 RX ORDER — FENTANYL CITRATE 50 UG/ML
INJECTION, SOLUTION INTRAMUSCULAR; INTRAVENOUS
Status: COMPLETED | OUTPATIENT
Start: 2018-11-28 | End: 2018-11-28

## 2018-11-28 RX ORDER — ACETAMINOPHEN 325 MG/1
650 TABLET ORAL EVERY 4 HOURS PRN
Status: DISCONTINUED | OUTPATIENT
Start: 2018-11-28 | End: 2018-11-29 | Stop reason: HOSPADM

## 2018-11-28 RX ORDER — MIDAZOLAM HYDROCHLORIDE 5 MG/ML
INJECTION INTRAMUSCULAR; INTRAVENOUS
Status: COMPLETED | OUTPATIENT
Start: 2018-11-28 | End: 2018-11-28

## 2018-11-28 RX ADMIN — MIDAZOLAM HYDROCHLORIDE 1 MG: 5 INJECTION, SOLUTION INTRAMUSCULAR; INTRAVENOUS at 10:27

## 2018-11-28 RX ADMIN — FENTANYL CITRATE 50 MCG: 50 INJECTION, SOLUTION INTRAMUSCULAR; INTRAVENOUS at 10:26

## 2018-11-28 ASSESSMENT — PAIN SCALES - GENERAL: PAINLEVEL_OUTOF10: 0

## 2018-11-28 ASSESSMENT — PAIN - FUNCTIONAL ASSESSMENT: PAIN_FUNCTIONAL_ASSESSMENT: 0-10

## 2018-12-05 ENCOUNTER — TELEPHONE (OUTPATIENT)
Dept: CARDIOTHORACIC SURGERY | Age: 73
End: 2018-12-05

## 2018-12-06 ENCOUNTER — TELEPHONE (OUTPATIENT)
Dept: CARDIOTHORACIC SURGERY | Age: 73
End: 2018-12-06

## 2018-12-06 NOTE — TELEPHONE ENCOUNTER
Spoke with patient regarding schedule of CT guided needle biopsy of left lung mass on 12/10/2018 @ 10:00 am with arrival time of 8:30 am @ [facility]. Patient knows not to eat or drink after midnight the day of the test, bring a list of current medications, stop any blood thinner or aspirin products 24 hours prior, and No caffeine 24 hours prior to exam.

## 2018-12-10 ENCOUNTER — HOSPITAL ENCOUNTER (OUTPATIENT)
Dept: GENERAL RADIOLOGY | Age: 73
Discharge: HOME | End: 2018-12-10
Payer: MEDICARE

## 2018-12-10 ENCOUNTER — HOSPITAL ENCOUNTER (OUTPATIENT)
Dept: INTERVENTIONAL RADIOLOGY/VASCULAR | Age: 73
Discharge: HOME | End: 2018-12-10
Payer: MEDICARE

## 2018-12-10 VITALS
HEIGHT: 65 IN | SYSTOLIC BLOOD PRESSURE: 163 MMHG | DIASTOLIC BLOOD PRESSURE: 46 MMHG | OXYGEN SATURATION: 100 % | TEMPERATURE: 97.6 F | HEART RATE: 85 BPM | RESPIRATION RATE: 16 BRPM | WEIGHT: 141 LBS | BODY MASS INDEX: 23.49 KG/M2

## 2018-12-10 DIAGNOSIS — R91.8 LUNG MASS: ICD-10-CM

## 2018-12-10 LAB
INR BLD: 1.06 (ref 0.86–1.14)
PROTHROMBIN TIME: 12.1 SEC (ref 9.8–13)

## 2018-12-10 PROCEDURE — 36415 COLL VENOUS BLD VENIPUNCTURE: CPT

## 2018-12-10 PROCEDURE — 71045 X-RAY EXAM CHEST 1 VIEW: CPT

## 2018-12-10 PROCEDURE — 6360000002 HC RX W HCPCS: Performed by: RADIOLOGY

## 2018-12-10 PROCEDURE — 2709999900 CT NEEDLE BIOPSY LUNG PERCUTANEOUS

## 2018-12-10 PROCEDURE — 7100000011 HC PHASE II RECOVERY - ADDTL 15 MIN

## 2018-12-10 PROCEDURE — 88342 IMHCHEM/IMCYTCHM 1ST ANTB: CPT

## 2018-12-10 PROCEDURE — 6370000000 HC RX 637 (ALT 250 FOR IP): Performed by: RADIOLOGY

## 2018-12-10 PROCEDURE — 7100000010 HC PHASE II RECOVERY - FIRST 15 MIN

## 2018-12-10 PROCEDURE — 88333 PATH CONSLTJ SURG CYTO XM 1: CPT

## 2018-12-10 PROCEDURE — 88305 TISSUE EXAM BY PATHOLOGIST: CPT

## 2018-12-10 PROCEDURE — 85610 PROTHROMBIN TIME: CPT

## 2018-12-10 PROCEDURE — 77012 CT SCAN FOR NEEDLE BIOPSY: CPT

## 2018-12-10 PROCEDURE — 88341 IMHCHEM/IMCYTCHM EA ADD ANTB: CPT

## 2018-12-10 RX ORDER — MIDAZOLAM HYDROCHLORIDE 5 MG/ML
INJECTION INTRAMUSCULAR; INTRAVENOUS
Status: COMPLETED | OUTPATIENT
Start: 2018-12-10 | End: 2018-12-10

## 2018-12-10 RX ORDER — ACETAMINOPHEN 325 MG/1
650 TABLET ORAL EVERY 4 HOURS PRN
Status: DISCONTINUED | OUTPATIENT
Start: 2018-12-10 | End: 2018-12-11 | Stop reason: HOSPADM

## 2018-12-10 RX ORDER — FENTANYL CITRATE 50 UG/ML
INJECTION, SOLUTION INTRAMUSCULAR; INTRAVENOUS
Status: COMPLETED | OUTPATIENT
Start: 2018-12-10 | End: 2018-12-10

## 2018-12-10 RX ORDER — 0.9 % SODIUM CHLORIDE 0.9 %
500 INTRAVENOUS SOLUTION INTRAVENOUS ONCE
Status: DISCONTINUED | OUTPATIENT
Start: 2018-12-10 | End: 2018-12-11 | Stop reason: HOSPADM

## 2018-12-10 RX ADMIN — FENTANYL CITRATE 50 MCG: 50 INJECTION, SOLUTION INTRAMUSCULAR; INTRAVENOUS at 11:28

## 2018-12-10 RX ADMIN — FENTANYL CITRATE 25 MCG: 50 INJECTION, SOLUTION INTRAMUSCULAR; INTRAVENOUS at 11:25

## 2018-12-10 RX ADMIN — ACETAMINOPHEN 650 MG: 325 TABLET ORAL at 12:14

## 2018-12-10 RX ADMIN — MIDAZOLAM HYDROCHLORIDE 1 MG: 5 INJECTION, SOLUTION INTRAMUSCULAR; INTRAVENOUS at 11:29

## 2018-12-10 RX ADMIN — MIDAZOLAM HYDROCHLORIDE 0.5 MG: 5 INJECTION, SOLUTION INTRAMUSCULAR; INTRAVENOUS at 11:25

## 2018-12-10 ASSESSMENT — PAIN SCALES - GENERAL
PAINLEVEL_OUTOF10: 3

## 2018-12-10 ASSESSMENT — PAIN DESCRIPTION - PAIN TYPE: TYPE: ACUTE PAIN

## 2018-12-10 ASSESSMENT — PAIN - FUNCTIONAL ASSESSMENT: PAIN_FUNCTIONAL_ASSESSMENT: 0-10

## 2018-12-13 ENCOUNTER — TELEPHONE (OUTPATIENT)
Dept: CARDIOTHORACIC SURGERY | Age: 73
End: 2018-12-13

## 2018-12-18 ENCOUNTER — TELEPHONE (OUTPATIENT)
Dept: CARDIOTHORACIC SURGERY | Age: 73
End: 2018-12-18

## 2019-01-01 ENCOUNTER — TELEPHONE (OUTPATIENT)
Dept: PULMONOLOGY | Age: 74
End: 2019-01-01

## 2019-01-01 ENCOUNTER — HOSPITAL ENCOUNTER (OUTPATIENT)
Dept: PULMONOLOGY | Age: 74
Discharge: HOME | End: 2019-12-23
Payer: MEDICARE

## 2019-01-01 ENCOUNTER — OFFICE VISIT (OUTPATIENT)
Dept: PULMONOLOGY | Age: 74
End: 2019-01-01
Payer: MEDICARE

## 2019-01-01 VITALS
BODY MASS INDEX: 19.89 KG/M2 | HEART RATE: 88 BPM | TEMPERATURE: 97.4 F | DIASTOLIC BLOOD PRESSURE: 61 MMHG | WEIGHT: 123.8 LBS | RESPIRATION RATE: 16 BRPM | SYSTOLIC BLOOD PRESSURE: 106 MMHG | OXYGEN SATURATION: 97 % | HEIGHT: 66 IN

## 2019-01-01 DIAGNOSIS — Z87.891 PERSONAL HISTORY OF TOBACCO USE: ICD-10-CM

## 2019-01-01 DIAGNOSIS — R93.89 ABNORMAL CT OF THE CHEST: Primary | ICD-10-CM

## 2019-01-01 DIAGNOSIS — J84.9 ILD (INTERSTITIAL LUNG DISEASE) (HCC): ICD-10-CM

## 2019-01-01 DIAGNOSIS — R05.3 CHRONIC COUGH: ICD-10-CM

## 2019-01-01 DIAGNOSIS — C34.92 SQUAMOUS CELL CARCINOMA OF LEFT LUNG (HCC): ICD-10-CM

## 2019-01-01 DIAGNOSIS — J43.9 PULMONARY EMPHYSEMA, UNSPECIFIED EMPHYSEMA TYPE (HCC): ICD-10-CM

## 2019-01-01 DIAGNOSIS — R09.02 HYPOXIA: ICD-10-CM

## 2019-01-01 DIAGNOSIS — R60.0 BILATERAL EDEMA OF LOWER EXTREMITY: ICD-10-CM

## 2019-01-01 DIAGNOSIS — J18.9 PNEUMONITIS: ICD-10-CM

## 2019-01-01 LAB
DLCO %PRED: 29 %
DLCO PRED: NORMAL
DLCO/VA %PRED: NORMAL
DLCO/VA PRED: NORMAL
DLCO/VA: NORMAL
DLCO: NORMAL
EXPIRATORY TIME-POST: NORMAL
EXPIRATORY TIME: NORMAL
FEF 25-75% %CHNG: NORMAL
FEF 25-75% %PRED-POST: NORMAL
FEF 25-75% %PRED-PRE: NORMAL
FEF 25-75% PRED: NORMAL
FEF 25-75%-POST: NORMAL
FEF 25-75%-PRE: NORMAL
FEV1 %PRED-POST: 66 %
FEV1 %PRED-PRE: 64 %
FEV1 PRED: NORMAL
FEV1-POST: NORMAL
FEV1-PRE: NORMAL
FEV1/FVC %PRED-POST: NORMAL
FEV1/FVC %PRED-PRE: NORMAL
FEV1/FVC PRED: NORMAL
FEV1/FVC-POST: 78 %
FEV1/FVC-PRE: 72 %
FVC %PRED-POST: NORMAL
FVC %PRED-PRE: NORMAL
FVC PRED: NORMAL
FVC-POST: NORMAL
FVC-PRE: NORMAL
GAW %PRED: NORMAL
GAW PRED: NORMAL
GAW: NORMAL
IC %PRED: NORMAL
IC PRED: NORMAL
IC: NORMAL
MEP: NORMAL
MIP: NORMAL
MVV %PRED-PRE: NORMAL
MVV PRED: NORMAL
MVV-PRE: NORMAL
PEF %PRED-POST: NORMAL
PEF %PRED-PRE: NORMAL
PEF PRED: NORMAL
PEF%CHNG: NORMAL
PEF-POST: NORMAL
PEF-PRE: NORMAL
RAW %PRED: NORMAL
RAW PRED: NORMAL
RAW: NORMAL
RV %PRED: NORMAL
RV PRED: NORMAL
RV: NORMAL
SVC %PRED: NORMAL
SVC PRED: NORMAL
SVC: NORMAL
TLC %PRED: 62 %
TLC PRED: NORMAL
TLC: NORMAL
VA %PRED: NORMAL
VA PRED: NORMAL
VA: NORMAL
VTG %PRED: NORMAL
VTG PRED: NORMAL
VTG: NORMAL

## 2019-01-01 PROCEDURE — 94618 PULMONARY STRESS TESTING: CPT

## 2019-01-01 PROCEDURE — 94640 AIRWAY INHALATION TREATMENT: CPT

## 2019-01-01 PROCEDURE — 94060 EVALUATION OF WHEEZING: CPT

## 2019-01-01 PROCEDURE — 99214 OFFICE O/P EST MOD 30 MIN: CPT | Performed by: INTERNAL MEDICINE

## 2019-01-01 PROCEDURE — 94729 DIFFUSING CAPACITY: CPT

## 2019-01-01 PROCEDURE — 94726 PLETHYSMOGRAPHY LUNG VOLUMES: CPT

## 2019-01-01 PROCEDURE — 6360000002 HC RX W HCPCS: Performed by: INTERNAL MEDICINE

## 2019-01-01 RX ORDER — FUROSEMIDE 40 MG/1
TABLET ORAL
COMMUNITY
Start: 2019-01-01 | End: 2020-01-01

## 2019-01-01 RX ORDER — ALBUTEROL SULFATE 2.5 MG/3ML
2.5 SOLUTION RESPIRATORY (INHALATION) ONCE
Status: COMPLETED | OUTPATIENT
Start: 2019-01-01 | End: 2019-01-01

## 2019-01-01 RX ADMIN — ALBUTEROL SULFATE 2.5 MG: 2.5 SOLUTION RESPIRATORY (INHALATION) at 12:08

## 2019-01-01 ASSESSMENT — PULMONARY FUNCTION TESTS
FEV1_PERCENT_PREDICTED_POST: 66
FEV1/FVC_PRE: 72
FEV1/FVC_POST: 78
FEV1_PERCENT_PREDICTED_PRE: 64

## 2019-02-28 ENCOUNTER — TELEPHONE (OUTPATIENT)
Dept: PULMONOLOGY | Age: 74
End: 2019-02-28

## 2019-03-21 ENCOUNTER — HOSPITAL ENCOUNTER (OUTPATIENT)
Dept: CT IMAGING | Age: 74
Discharge: HOME | End: 2019-03-21
Payer: MEDICARE

## 2019-03-21 DIAGNOSIS — C34.12 MALIGNANT NEOPLASM OF UPPER LOBE OF LEFT LUNG (HCC): ICD-10-CM

## 2019-03-21 PROCEDURE — 71250 CT THORAX DX C-: CPT

## 2019-11-20 ENCOUNTER — TELEPHONE (OUTPATIENT)
Dept: PULMONOLOGY | Age: 74
End: 2019-11-20

## 2019-11-26 ENCOUNTER — OFFICE VISIT (OUTPATIENT)
Dept: PULMONOLOGY | Age: 74
End: 2019-11-26
Payer: MEDICARE

## 2019-11-26 VITALS
HEART RATE: 89 BPM | RESPIRATION RATE: 15 BRPM | OXYGEN SATURATION: 91 % | DIASTOLIC BLOOD PRESSURE: 60 MMHG | HEIGHT: 65 IN | WEIGHT: 124 LBS | SYSTOLIC BLOOD PRESSURE: 118 MMHG | BODY MASS INDEX: 20.66 KG/M2

## 2019-11-26 DIAGNOSIS — C34.92 SQUAMOUS CELL CARCINOMA OF LEFT LUNG (HCC): ICD-10-CM

## 2019-11-26 DIAGNOSIS — J43.9 PULMONARY EMPHYSEMA, UNSPECIFIED EMPHYSEMA TYPE (HCC): ICD-10-CM

## 2019-11-26 DIAGNOSIS — J18.9 PNEUMONITIS: ICD-10-CM

## 2019-11-26 DIAGNOSIS — R05.3 CHRONIC COUGH: Primary | ICD-10-CM

## 2019-11-26 DIAGNOSIS — R91.1 PULMONARY NODULE: ICD-10-CM

## 2019-11-26 DIAGNOSIS — J84.9 ILD (INTERSTITIAL LUNG DISEASE) (HCC): ICD-10-CM

## 2019-11-26 DIAGNOSIS — R93.89 ABNORMAL CT OF THE CHEST: ICD-10-CM

## 2019-11-26 PROCEDURE — 99215 OFFICE O/P EST HI 40 MIN: CPT | Performed by: INTERNAL MEDICINE

## 2019-11-26 RX ORDER — TRAMADOL HYDROCHLORIDE 50 MG/1
50 TABLET ORAL EVERY 6 HOURS PRN
COMMUNITY
End: 2020-01-01

## 2019-11-26 RX ORDER — ALBUTEROL SULFATE 90 UG/1
2 AEROSOL, METERED RESPIRATORY (INHALATION) EVERY 6 HOURS PRN
Qty: 1 INHALER | Refills: 6 | Status: SHIPPED | OUTPATIENT
Start: 2019-11-26

## 2019-11-26 RX ORDER — PHENOL 1.4 %
AEROSOL, SPRAY (ML) MUCOUS MEMBRANE
COMMUNITY
End: 2020-01-01

## 2019-11-26 RX ORDER — GABAPENTIN 300 MG/1
300 CAPSULE ORAL DAILY
COMMUNITY

## 2019-11-26 RX ORDER — ALBUTEROL SULFATE 2.5 MG/3ML
2.5 SOLUTION RESPIRATORY (INHALATION) EVERY 6 HOURS PRN
Qty: 120 VIAL | Refills: 6 | Status: SHIPPED | OUTPATIENT
Start: 2019-11-26

## 2019-11-26 RX ORDER — TRIAMTERENE AND HYDROCHLOROTHIAZIDE 37.5; 25 MG/1; MG/1
1 TABLET ORAL DAILY
COMMUNITY
End: 2020-01-01

## 2019-12-10 ENCOUNTER — TELEPHONE (OUTPATIENT)
Dept: PULMONOLOGY | Age: 74
End: 2019-12-10

## 2019-12-10 DIAGNOSIS — J43.9 PULMONARY EMPHYSEMA, UNSPECIFIED EMPHYSEMA TYPE (HCC): Primary | ICD-10-CM

## 2019-12-17 ENCOUNTER — TELEPHONE (OUTPATIENT)
Dept: PULMONOLOGY | Age: 74
End: 2019-12-17

## 2020-01-01 ENCOUNTER — HOSPITAL ENCOUNTER (OUTPATIENT)
Dept: PULMONOLOGY | Age: 75
Discharge: HOME | End: 2020-08-27
Payer: MEDICARE

## 2020-01-01 ENCOUNTER — TELEPHONE (OUTPATIENT)
Dept: PULMONOLOGY | Age: 75
End: 2020-01-01

## 2020-01-01 ENCOUNTER — HOSPITAL ENCOUNTER (OUTPATIENT)
Dept: NON INVASIVE DIAGNOSTICS | Age: 75
Discharge: HOME | End: 2020-01-03
Payer: MEDICARE

## 2020-01-01 ENCOUNTER — VIRTUAL VISIT (OUTPATIENT)
Dept: PULMONOLOGY | Age: 75
End: 2020-01-01
Payer: MEDICARE

## 2020-01-01 VITALS — OXYGEN SATURATION: 95 %

## 2020-01-01 LAB
LV EF: 55 %
LVEF MODALITY: NORMAL

## 2020-01-01 PROCEDURE — 94640 AIRWAY INHALATION TREATMENT: CPT

## 2020-01-01 PROCEDURE — 94729 DIFFUSING CAPACITY: CPT

## 2020-01-01 PROCEDURE — 94060 EVALUATION OF WHEEZING: CPT

## 2020-01-01 PROCEDURE — 94760 N-INVAS EAR/PLS OXIMETRY 1: CPT

## 2020-01-01 PROCEDURE — 6370000000 HC RX 637 (ALT 250 FOR IP): Performed by: INTERNAL MEDICINE

## 2020-01-01 PROCEDURE — 99443 PR PHYS/QHP TELEPHONE EVALUATION 21-30 MIN: CPT | Performed by: INTERNAL MEDICINE

## 2020-01-01 PROCEDURE — 94726 PLETHYSMOGRAPHY LUNG VOLUMES: CPT

## 2020-01-01 PROCEDURE — 93306 TTE W/DOPPLER COMPLETE: CPT

## 2020-01-01 RX ORDER — OXYCODONE HYDROCHLORIDE 5 MG/1
5 TABLET ORAL EVERY 8 HOURS PRN
COMMUNITY

## 2020-01-01 RX ORDER — ALBUTEROL SULFATE 90 UG/1
2 AEROSOL, METERED RESPIRATORY (INHALATION) ONCE
Status: COMPLETED | OUTPATIENT
Start: 2020-01-01 | End: 2020-01-01

## 2020-01-01 RX ADMIN — Medication 2 PUFF: at 12:06

## 2020-08-26 NOTE — TELEPHONE ENCOUNTER
limited to the following:    Your Provider(s) may not able to provide medical treatment for your particular condition and you may be required to seek alternative healthcare or emergency care services.  The electronic systems or other security protocols or safeguards used in the Service could fail, causing a breach of privacy of your medical or other information.  Given regulatory requirements in certain jurisdictions, your Provider(s) diagnosis and/or treatment options, especially pertaining to certain prescriptions, may be limited. Acceptance   1. You understand that Services will be provided via Telehealth. This process involves the use of HIPAA compliant and secure, real-time audio-visual interfacing with a qualified and appropriately trained provider located at [facility]. 2. You understand that, under no circumstances, will this session be recorded. 3. You understand that the Provider(s) at [facility] and other clinical participants will be party to the information obtained during the Telehealth session in accordance with best medical practices. 4. You understand that the information obtained during the Telehealth session will be used to help determine the most appropriate treatment options. 5. You understand that You have the right to revoke this consent at any point in time. 6. You understand that Telehealth is voluntary, and that continued treatment is not dependent upon consent. 7. You understand that, in the event of non-consent to Telehealth services and/or technical difficulties, you will obtain services as typically provided in the absence of Telehealth technology. 8. You understand that this consent will be kept in Your medical record. 9. No potential benefits from the use of Telehealth or specific results can be guaranteed. Your condition may not be cured or improved and, in some cases, may get worse.    10. There are limitations in the provision of medical care and treatment via Telehealth and the Service and you may not be able to receive diagnosis and/or treatment through the Service for every condition for which you seek diagnosis and/or treatment. 11. There are potential risks to the use of Telehealth, including but not limited to the risks described in this Telehealth Consent. 12. Your Provider(s) have discussed the use of Telehealth and the Service with you, including the benefits and risks of such and you have provided oral consent to your Provider(s) for the use of Telehealth and the Service. 15. You understand that it is your duty to provide your Provider(s) truthful, accurate and complete information, including all relevant information regarding care that you may have received or may be receiving from other healthcare providers outside of the Service. 14. You understand that each of your Provider(s) may determine in his or sole discretion that your condition is not suitable for diagnosis and/or treatment using the Service, and that you may need to seek medical care and treatment a specialist or other healthcare provider, outside of the Service. 15. You understand that you are fully responsible for payment for all services provided by Provider(s) or through use of the Service and that you may not be able to use third-party insurance. 16. You represent that (a) you have read this Telehealth Consent carefully, (b) you understand the risks and benefits of the Service and the use of Telehealth in the medical care and treatment provided to you by Provider(s) using the Service, and (c) you have the legal capacity and authority to provide this consent for yourself and/or the minor for which you are consenting under applicable federal and state laws, including laws relating to the age of [de-identified] and/or parental/guardian consent.    17. You give your informed consent to the use of Telehealth by Provider(s) using the Service under the terms described in the Terms of Service and this Telehealth Consent. The patient was read the following statement and has consented to the visit as of 8/26/20. The patient has been scheduled for their first telehealth visit on 8/27/2020 with Dr Loco Mathew.

## 2020-08-27 NOTE — PROGRESS NOTES
[facility]                                                    TELEHEALTH EVALUATION: Service performed was Audio (During [id number]-[id number] public health emergency) and not a face-to-face visit         CHIEF COMPLAINT: Follow-up PFTs          HPI:   PFTs were reviewed by me and noted. Results were dicussed with patient and multiple good questions were answered. Breathing is fine   Not much cough   No sputum   No hemoptysis   No smoking   Does not feel she needs Spiriva  Uses Albuterol on and off     Past Medical History:   Diagnosis Date    Acid reflux     Arthritis     back and Rt hip pain    Emphysema of lung ([address]     Hyperlipidemia     Hypertension     ILD (interstitial lung disease) ([address]     Lung nodule left    Pneumonia     Seasonal allergies     Thyroid disease        Past Surgical History:        Procedure Laterality Date    BONE BIOPSY  2/10/2011    left hip and pelvis    COLONOSCOPY  12/2/10    EYE SURGERY      LUNG BIOPSY Left 10/14/2014    OTHER SURGICAL HISTORY Left 12/29/2014    left thoracotomy,lowerlobectomy,upper wedge resection    TONSILLECTOMY      TUBAL LIGATION         Allergies:  is allergic to lipitor [atorvastatin]. Social History:    TOBACCO:   reports that she quit smoking about 19 months ago. Her smoking use included cigarettes. She has a 40.00 pack-year smoking history. She has never used smokeless tobacco.  ETOH:   reports current alcohol use of about 1.0 standard drinks of alcohol per week.       Family History:       Problem Relation Age of Onset    Stroke Mother    Venancio Lexi Cancer Sister         one sis colon   one pancreatic    Hypertension Sister     Stroke Sister    Venancio Lexi Cancer Sister     Hypertension Sister     Heart Failure Brother     Hypertension Brother     Asthma Neg Hx     Emphysema Neg Hx     Diabetes Neg Hx        Current Medications:    Current Outpatient Medications:     albuterol mmHg). Assessment:   · Abnormal CT chest 9/19/2019 -likely radiation pneumonitis on underlying ILD. Cannot exclude durvalumab related pneumonitis. Off durvalumab since 9/2019. Clinically improved with prolonged course of steroids and antibiotics  · Pulmonary emphysema  · Basilar mild ILD. IPF vs NSIP vs smoking related ILD. Negative CTD workup. · Chronic cough-secondary to above. Improving  · Hypoxia on exertion on 6-minute walk today-secondary to above  · Nocturnal hypoxia   · New 8 mm TOSHIA nodule. Growing on repeat CT chest 9/19/2018. Surgical lung biopsy 12/10/2018 by Dr. Juan José Womack showed squamous cell carcinoma. Followed by oncology and was deemed clinical stage T1c N2 M0 (stage T3a). Completed chemotherapy 4/29/2019 and radiation therapy 3/15/2019. Durvalumab started 6/5/2019-9/2019 with the plan for 12 months. No evidence of recurrence on repeat CT 11/13/2019. · LLL 1.7 cm nodule 10/2/2014 (SUV 4.9). 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 Declined adjuvant chemotherapy   · Lingula wedge resection showed calcified granulomas with fungal organism   · > 50 pack year smoking- quit 8/1386   · Diastolic CHF, mild aortic stenosis, mild to moderate aortic regurg followed by cardiology        Plan:     Continue 2 L O2 on exertion if needed. Advised to titrate O2 using her pulse oximeter- target O2 sat 90-92%. Continue O2 1LPM at night   Continue Albuterol Neb/INH Q4-6 hrs PRN   D/C Spiriva   Patient is up to date with Pneumococcal vaccine   Advised to get influenza vaccine this year   Advised to continue with smoking cessation  CT chest per oncology - advised to to follow up with oncology   Follow-up in 1 year or sooner if needed               Bay Dan is a 75 y.o. female evaluated via telephone on 8/27/2020.       Consent:  She and/or health care decision maker is aware that that she may receive a bill for this telephone service, depending on her insurance coverage, and has provided verbal consent to proceed: Yes       Documentation:  I communicated with the patient and/or health care decision maker about: See above   Details of this discussion including any medical advice provided: See above       I Affirm this is a Patient Initiated Episode with an Established Patient who has not had a related appointment within my department in the past 7 days or scheduled within the next 24 hours.     Total Time: 21-30 minutes     Note: not billable if this call serves to triage the patient into an appointment for the relevant concern      Marc Stevenson

## 2020-09-01 NOTE — PROCEDURES
[address]                 [address] [address]                               PULMONARY FUNCTION    PATIENT NAME: Venancio Machado              :        1945  MED REC NO:   [id number]                          ROOM:  ACCOUNT NO:   [de-identified]                           ADMIT DATE: 2020  PROVIDER:     Etienne Fontana MD    DATE OF PROCEDURE:  2020    INDICATION:  Abnormal CT imaging. FINDINGS:  1. Spirometry: The FEV1 is 1.42 liters or 63% of predicted. The FVC  is 1.92 liters, which is 64% predicted. The FEV1/FVC ratio is normal.   Inhaled bronchodilators are given. There is no significant improvement. 2.  Lung volumes: Total lung capacity is reduced to 3.17 liters or 61%  of predicted. 3.  Diffusion capacity:  DLCO is 7.14 mL/min/mmHg, which is 31% of  predicted. IMPRESSION:  1. No obstructive lung defect. 2.  Moderate restrictive lung defect. 3.  Severe reduction in diffusion capacity.         Darylene Berkshire, MD    D: 2020 12:19:54       T: 2020 12:54:27     [email or web address]  Job#: [id number]     Doc#: [id number]    CC:

## 2020-12-30 ENCOUNTER — TELEPHONE (OUTPATIENT)
Dept: PULMONOLOGY | Age: 75
End: 2020-12-30

## 2020-12-30 NOTE — TELEPHONE ENCOUNTER
Stefani called to let office know that family reports that pt passed away. Confirmed with obituary, that pt is  as of 19. Binu Cadena, a resident of [facility], passed away on 2020, at the age of 67. She was the loving wife of Liu Burkett and the late [address] [facility] mother of Marie Coronado) rCis HUGHES, and Kina Latham. Caring stepmother of Noé Corrales ([address]) [address] and Jennifer [place]) Jonh Hendrickson. Dear grandmother of [facility]), Yrn Diego (Carly Everett), Ash Ludwig (Rolan), Maribel (Pasha), Belvie Mass Aruna), and Laura Guido. Blessed great-grandmother to Caity Wells, Jhon, Catrachita Ward, Rommel Wilks, Juan Carlos, Michelle, Cheryle, [facility] jacki, and Kameron. Sister of Rustam Kincaid, Asher, Yanna Bautista, and [employer]. Also survived by many nieces, nephews, and friends. In addition to her first , she was preceded in death by her parents: Vito Chan and Edouard Bedolla, and her siblings: Yolanda Geraldine, Allison Marie, Mary Annfina Parkara, [place], [place], [address], and [place]. Visitation for Nicole Love was held at [place] on , with  services on , also at [employer]. Interment was at [facility]